# Patient Record
Sex: FEMALE | Race: WHITE | Employment: FULL TIME | URBAN - METROPOLITAN AREA
[De-identification: names, ages, dates, MRNs, and addresses within clinical notes are randomized per-mention and may not be internally consistent; named-entity substitution may affect disease eponyms.]

---

## 2017-04-30 ENCOUNTER — OFFICE VISIT (OUTPATIENT)
Dept: FAMILY MEDICINE CLINIC | Facility: CLINIC | Age: 37
End: 2017-04-30

## 2017-04-30 VITALS
DIASTOLIC BLOOD PRESSURE: 62 MMHG | HEIGHT: 64 IN | TEMPERATURE: 98 F | HEART RATE: 76 BPM | OXYGEN SATURATION: 100 % | WEIGHT: 110 LBS | SYSTOLIC BLOOD PRESSURE: 122 MMHG | RESPIRATION RATE: 16 BRPM | BODY MASS INDEX: 18.78 KG/M2

## 2017-04-30 DIAGNOSIS — J02.9 SORE THROAT: Primary | ICD-10-CM

## 2017-04-30 PROCEDURE — 99213 OFFICE O/P EST LOW 20 MIN: CPT | Performed by: NURSE PRACTITIONER

## 2017-04-30 PROCEDURE — 87880 STREP A ASSAY W/OPTIC: CPT | Performed by: NURSE PRACTITIONER

## 2017-04-30 RX ORDER — AMOXICILLIN 875 MG/1
875 TABLET, COATED ORAL 2 TIMES DAILY
Qty: 20 TABLET | Refills: 0 | Status: SHIPPED | OUTPATIENT
Start: 2017-04-30 | End: 2017-05-10

## 2017-04-30 NOTE — PROGRESS NOTES
CHIEF COMPLAINT:   Patient presents with:  Sore Throat: x 2 dys . no other sx         HPI:   Katie Cardozo is a 29year old female presents to clinic with complaint of sore throat.  Came on really fast yesterday, moderate to severe pain, can't sleep, can't EARS: TM's clear, non-injected, no bulging, retraction, or fluid bilaterally  NOSE: nostrils patent, no nasal discharge, nasal mucosa pink, moist  THROAT: oral mucosa pink, moist. Posterior pharynx erythematous and injected. no exudates. Tonsils 1+/4.   Arabella You have pharyngitis (sore throat). The cause is thought to be the streptococcus, or strep, bacterium. Strep throat infection can cause throat pain that is worse when swallowing, aching all over, headache, and fever.  The infection may be spread by coughing · Signs of dehydration (very dark urine or no urine, sunken eyes, dizziness)  · Trouble breathing or noisy breathing  · Muffled voice  · New rash  Date Last Reviewed: 4/13/2015  © [id number] [place] [address]

## 2017-04-30 NOTE — PATIENT INSTRUCTIONS
Pharyngitis: Strep (Presumed)    You have pharyngitis (sore throat). The cause is thought to be the streptococcus, or strep, bacterium. Strep throat infection can cause throat pain that is worse when swallowing, aching all over, headache, and fever.  The · Inability to swallow liquids, excessive drooling, or inability to open mouth wide due to throat pain  · Signs of dehydration (very dark urine or no urine, sunken eyes, dizziness)  · Trouble breathing or noisy breathing  · Muffled voice  · New rash  Date

## 2017-11-12 ENCOUNTER — IMMUNIZATION (OUTPATIENT)
Dept: FAMILY MEDICINE CLINIC | Facility: CLINIC | Age: 37
End: 2017-11-12

## 2017-11-12 ENCOUNTER — MED REC SCAN ONLY (OUTPATIENT)
Dept: FAMILY MEDICINE CLINIC | Facility: CLINIC | Age: 37
End: 2017-11-12

## 2017-11-12 DIAGNOSIS — Z23 NEED FOR VACCINATION: ICD-10-CM

## 2017-11-12 PROCEDURE — 90471 IMMUNIZATION ADMIN: CPT | Performed by: NURSE PRACTITIONER

## 2017-11-12 PROCEDURE — 90686 IIV4 VACC NO PRSV 0.5 ML IM: CPT | Performed by: NURSE PRACTITIONER

## 2019-01-11 PROCEDURE — 87624 HPV HI-RISK TYP POOLED RSLT: CPT | Performed by: OBSTETRICS & GYNECOLOGY

## 2019-01-11 PROCEDURE — 88175 CYTOPATH C/V AUTO FLUID REDO: CPT | Performed by: OBSTETRICS & GYNECOLOGY

## 2020-12-22 ENCOUNTER — OFFICE VISIT (OUTPATIENT)
Dept: INTERNAL MEDICINE CLINIC | Facility: CLINIC | Age: 40
End: 2020-12-22
Payer: COMMERCIAL

## 2020-12-22 VITALS
SYSTOLIC BLOOD PRESSURE: 110 MMHG | HEIGHT: 64 IN | DIASTOLIC BLOOD PRESSURE: 62 MMHG | BODY MASS INDEX: 19.81 KG/M2 | TEMPERATURE: 98 F | WEIGHT: 116 LBS | RESPIRATION RATE: 16 BRPM | OXYGEN SATURATION: 98 % | HEART RATE: 78 BPM

## 2020-12-22 DIAGNOSIS — L08.9 INFECTION OF SKIN OF FINGER: Primary | ICD-10-CM

## 2020-12-22 PROBLEM — L40.9 PSORIASIS: Status: ACTIVE | Noted: 2020-12-22

## 2020-12-22 PROBLEM — I73.00 RAYNAUD'S PHENOMENON WITHOUT GANGRENE: Status: ACTIVE | Noted: 2020-12-22

## 2020-12-22 PROCEDURE — 3078F DIAST BP <80 MM HG: CPT | Performed by: PHYSICIAN ASSISTANT

## 2020-12-22 PROCEDURE — 99203 OFFICE O/P NEW LOW 30 MIN: CPT | Performed by: PHYSICIAN ASSISTANT

## 2020-12-22 PROCEDURE — 3008F BODY MASS INDEX DOCD: CPT | Performed by: PHYSICIAN ASSISTANT

## 2020-12-22 PROCEDURE — 3074F SYST BP LT 130 MM HG: CPT | Performed by: PHYSICIAN ASSISTANT

## 2020-12-22 RX ORDER — DOXYCYCLINE HYCLATE 100 MG/1
100 CAPSULE ORAL 2 TIMES DAILY
Qty: 14 CAPSULE | Refills: 0 | Status: SHIPPED | OUTPATIENT
Start: 2020-12-22 | End: 2021-03-16

## 2020-12-22 NOTE — PATIENT INSTRUCTIONS
Take antibiotic as directed until completely finished. Contact us if you experience any adverse reaction to the medication. Ok to use over-the-counter hydrocortisone cream and gentle moisturizer on the skin as well.  Monitor closely and follow up if any

## 2020-12-22 NOTE — PROGRESS NOTES
HPI:    Patient ID: Maria Ruiz is a 45year old female. Patient presents with:  Finger Pain: Right middle finger pain, redness and swelling     Finger Pain   Pain location: right middle finger. This is a new problem. Episode onset: 10d.  There has been no slight soft tissue swelling  No wound, drainage, vesicles or ulceration    Otherwise normal skin on bl hands   Psychiatric: She has a normal mood and affect. Her behavior is normal.   Nursing note and vitals reviewed.       /62   Pulse 78   Temp 98 °F

## 2021-03-16 ENCOUNTER — OFFICE VISIT (OUTPATIENT)
Dept: RHEUMATOLOGY | Facility: CLINIC | Age: 41
End: 2021-03-16
Payer: COMMERCIAL

## 2021-03-16 VITALS
HEART RATE: 78 BPM | TEMPERATURE: 97 F | HEIGHT: 64 IN | BODY MASS INDEX: 19.63 KG/M2 | DIASTOLIC BLOOD PRESSURE: 78 MMHG | SYSTOLIC BLOOD PRESSURE: 120 MMHG | WEIGHT: 115 LBS

## 2021-03-16 DIAGNOSIS — L40.50 PSORIATIC ARTHRITIS (HCC): Primary | ICD-10-CM

## 2021-03-16 DIAGNOSIS — L40.9 PSORIASIS: ICD-10-CM

## 2021-03-16 DIAGNOSIS — I73.00 RAYNAUD'S PHENOMENON WITHOUT GANGRENE: ICD-10-CM

## 2021-03-16 PROCEDURE — 3078F DIAST BP <80 MM HG: CPT | Performed by: INTERNAL MEDICINE

## 2021-03-16 PROCEDURE — 3074F SYST BP LT 130 MM HG: CPT | Performed by: INTERNAL MEDICINE

## 2021-03-16 PROCEDURE — 99245 OFF/OP CONSLTJ NEW/EST HI 55: CPT | Performed by: INTERNAL MEDICINE

## 2021-03-16 PROCEDURE — 3008F BODY MASS INDEX DOCD: CPT | Performed by: INTERNAL MEDICINE

## 2021-03-16 NOTE — PROGRESS NOTES
[facility]  Report of Consultation    Gala Carey Patient Status:  No patient class for patient encounter    9/3/1980 MRN [id number]   Location [facility] PCP Robert Iyer MD     Date of Consult:  3/16/21    Reason for Consultation: Mitzy Buenrostro Multiple Vitamins-Minerals (MULTI-VITAMIN/MINERALS) Oral Tab, Take 1 tablet by mouth daily. , Disp: , Rfl:     Review of Systems:   No recent fever or chills. No recent weight loss or weight gain.   No current chest pain, shortness of breath, or palpitations 220 mg 1-2 at a time twice a day. Continue cortisone salves as needed for the skin.   If the arthritis progresses come back in, options of more aggressive treatment include Methotrexate or Otexzla, 2 oral disease modifying oral agents, or injectable more a

## 2021-03-16 NOTE — PATIENT INSTRUCTIONS
Check Sed Rate/CRP labs to check for inflammation. 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 Use Ibuprofen 200 mg otc 2-3 tablets as needed. Or use aleve 220 mg 1-2 at a time twice a day. Continue cortisone salves as needed for the skin.   If the a

## 2021-03-19 ENCOUNTER — LAB ENCOUNTER (OUTPATIENT)
Dept: LAB | Facility: HOSPITAL | Age: 41
End: 2021-03-19
Attending: INTERNAL MEDICINE
Payer: COMMERCIAL

## 2021-03-19 DIAGNOSIS — L40.50 PSORIATIC ARTHRITIS (HCC): ICD-10-CM

## 2021-03-19 DIAGNOSIS — L40.9 PSORIASIS: ICD-10-CM

## 2021-03-19 LAB
CRP SERPL-MCNC: <0.29 MG/DL (ref ?–0.3)
SED RATE-ML: 6 MM/HR

## 2021-03-19 PROCEDURE — 36415 COLL VENOUS BLD VENIPUNCTURE: CPT

## 2021-03-19 PROCEDURE — 86140 C-REACTIVE PROTEIN: CPT

## 2021-03-19 PROCEDURE — 85652 RBC SED RATE AUTOMATED: CPT

## 2022-03-22 ENCOUNTER — OFFICE VISIT (OUTPATIENT)
Dept: FAMILY MEDICINE CLINIC | Facility: CLINIC | Age: 42
End: 2022-03-22
Payer: COMMERCIAL

## 2022-03-22 ENCOUNTER — HOSPITAL ENCOUNTER (OUTPATIENT)
Age: 42
Discharge: STILL ADMITTED OR ADMINISTRATIVE | End: 2022-03-22
Payer: COMMERCIAL

## 2022-03-22 ENCOUNTER — TELEPHONE (OUTPATIENT)
Dept: URGENT CARE | Age: 42
End: 2022-03-22

## 2022-03-22 VITALS
RESPIRATION RATE: 16 BRPM | TEMPERATURE: 98 F | OXYGEN SATURATION: 99 % | HEART RATE: 77 BPM | WEIGHT: 120 LBS | BODY MASS INDEX: 20.49 KG/M2 | DIASTOLIC BLOOD PRESSURE: 70 MMHG | HEIGHT: 64 IN | SYSTOLIC BLOOD PRESSURE: 122 MMHG

## 2022-03-22 DIAGNOSIS — J06.9 UPPER RESPIRATORY TRACT INFECTION, UNSPECIFIED TYPE: Primary | ICD-10-CM

## 2022-03-22 PROCEDURE — 3074F SYST BP LT 130 MM HG: CPT | Performed by: NURSE PRACTITIONER

## 2022-03-22 PROCEDURE — 3008F BODY MASS INDEX DOCD: CPT | Performed by: NURSE PRACTITIONER

## 2022-03-22 PROCEDURE — 3078F DIAST BP <80 MM HG: CPT | Performed by: NURSE PRACTITIONER

## 2022-03-22 PROCEDURE — 99213 OFFICE O/P EST LOW 20 MIN: CPT | Performed by: NURSE PRACTITIONER

## 2022-06-04 ENCOUNTER — OFFICE VISIT (OUTPATIENT)
Dept: FAMILY MEDICINE CLINIC | Facility: CLINIC | Age: 42
End: 2022-06-04
Payer: COMMERCIAL

## 2022-06-04 VITALS — OXYGEN SATURATION: 97 % | HEART RATE: 55 BPM | TEMPERATURE: 98 F

## 2022-06-04 DIAGNOSIS — J02.9 SORE THROAT: Primary | ICD-10-CM

## 2022-06-04 LAB — CONTROL LINE PRESENT WITH A CLEAR BACKGROUND (YES/NO): YES YES/NO

## 2022-06-04 PROCEDURE — 87880 STREP A ASSAY W/OPTIC: CPT | Performed by: FAMILY MEDICINE

## 2022-06-04 PROCEDURE — 99213 OFFICE O/P EST LOW 20 MIN: CPT | Performed by: FAMILY MEDICINE

## 2022-06-04 NOTE — PATIENT INSTRUCTIONS
Your strep was negative in the office. Use OTC meds for comfort as needed--  Ibuprofen/Tylenol for fever/pain  Use Benadryl at bedtime to reduce drainage and promote rest.  Zyrtec/Claritin/Allegra in the AM to reduce nasal drainage without sedation. Use saline nasal sprays to reduce congestion and thin secretions. Use Delsym for cough. Consider applying rylee's vapo-rub or eucayptus oil to chest and feet at bedtime to reduce chest and nasal congestion. Warm tea with honey, cough lozenges, vaporizers/steam etc.    If no better in 2-3 days, follow-up for further evaluation.

## 2023-07-28 ENCOUNTER — HOSPITAL ENCOUNTER (OUTPATIENT)
Age: 43
Discharge: HOME | End: 2023-07-28
Payer: COMMERCIAL

## 2023-07-28 VITALS
OXYGEN SATURATION: 100 % | RESPIRATION RATE: 16 BRPM | DIASTOLIC BLOOD PRESSURE: 84 MMHG | HEART RATE: 96 BPM | TEMPERATURE: 98 F | SYSTOLIC BLOOD PRESSURE: 145 MMHG

## 2023-07-28 DIAGNOSIS — B02.9 HERPES ZOSTER WITHOUT COMPLICATION: Primary | ICD-10-CM

## 2023-07-28 PROCEDURE — 99204 OFFICE O/P NEW MOD 45 MIN: CPT

## 2023-07-28 PROCEDURE — 99213 OFFICE O/P EST LOW 20 MIN: CPT

## 2023-07-28 RX ORDER — ACYCLOVIR 400 MG/1
400 TABLET ORAL
Qty: 35 TABLET | Refills: 0 | Status: SHIPPED | OUTPATIENT
Start: 2023-07-28 | End: 2023-08-04

## 2023-07-30 ENCOUNTER — HOSPITAL ENCOUNTER (EMERGENCY)
Facility: HOSPITAL | Age: 43
Discharge: HOME | End: 2023-07-30
Attending: EMERGENCY MEDICINE
Payer: COMMERCIAL

## 2023-07-30 VITALS
RESPIRATION RATE: 18 BRPM | TEMPERATURE: 99 F | HEART RATE: 71 BPM | OXYGEN SATURATION: 99 % | WEIGHT: 115 LBS | BODY MASS INDEX: 19.63 KG/M2 | HEIGHT: 64 IN | SYSTOLIC BLOOD PRESSURE: 141 MMHG | DIASTOLIC BLOOD PRESSURE: 93 MMHG

## 2023-07-30 DIAGNOSIS — H18.20 INFILTRATE OF CORNEA: Primary | ICD-10-CM

## 2023-07-30 DIAGNOSIS — B02.8 HERPES ZOSTER WITH COMPLICATION: ICD-10-CM

## 2023-07-30 PROCEDURE — 99284 EMERGENCY DEPT VISIT MOD MDM: CPT

## 2023-07-30 PROCEDURE — 99283 EMERGENCY DEPT VISIT LOW MDM: CPT

## 2023-07-30 RX ORDER — TETRACAINE HYDROCHLORIDE 5 MG/ML
2 SOLUTION OPHTHALMIC ONCE
Status: COMPLETED | OUTPATIENT
Start: 2023-07-30 | End: 2023-07-30

## 2023-07-30 NOTE — ED INITIAL ASSESSMENT (HPI)
Pt ambulatory to ER with father, states confirmed shingles in urgent care on Friday, onset of symptoms (rash) on Wednesday of last week. States woke up this morning with severe swelling of rt eye. Denies any vision changes or disturbances.  Was prescribed Acyclovir and bilateral opto drops, states she has been compliant in taking medications

## 2023-11-17 ENCOUNTER — OFFICE VISIT (OUTPATIENT)
Dept: INTERNAL MEDICINE CLINIC | Facility: CLINIC | Age: 43
End: 2023-11-17
Payer: COMMERCIAL

## 2023-11-17 VITALS
TEMPERATURE: 97 F | OXYGEN SATURATION: 98 % | WEIGHT: 119 LBS | HEART RATE: 62 BPM | RESPIRATION RATE: 16 BRPM | DIASTOLIC BLOOD PRESSURE: 60 MMHG | BODY MASS INDEX: 20.32 KG/M2 | HEIGHT: 64 IN | SYSTOLIC BLOOD PRESSURE: 98 MMHG

## 2023-11-17 DIAGNOSIS — Z00.00 LABORATORY EXAM ORDERED AS PART OF ROUTINE GENERAL MEDICAL EXAMINATION: ICD-10-CM

## 2023-11-17 DIAGNOSIS — L40.50 PSORIATIC ARTHRITIS (HCC): ICD-10-CM

## 2023-11-17 DIAGNOSIS — Z00.00 ROUTINE PHYSICAL EXAMINATION: Primary | ICD-10-CM

## 2023-11-17 DIAGNOSIS — Z12.31 ENCOUNTER FOR SCREENING MAMMOGRAM FOR MALIGNANT NEOPLASM OF BREAST: ICD-10-CM

## 2023-11-18 ENCOUNTER — LABORATORY ENCOUNTER (OUTPATIENT)
Dept: LAB | Age: 43
End: 2023-11-18
Attending: PHYSICIAN ASSISTANT
Payer: COMMERCIAL

## 2023-11-18 DIAGNOSIS — Z00.00 LABORATORY EXAM ORDERED AS PART OF ROUTINE GENERAL MEDICAL EXAMINATION: ICD-10-CM

## 2023-11-18 LAB
ALBUMIN SERPL-MCNC: 3.9 G/DL (ref 3.4–5)
ALBUMIN/GLOB SERPL: 1.1 {RATIO} (ref 1–2)
ALP LIVER SERPL-CCNC: 31 U/L
ALT SERPL-CCNC: 17 U/L
ANION GAP SERPL CALC-SCNC: 4 MMOL/L (ref 0–18)
AST SERPL-CCNC: 11 U/L (ref 15–37)
BASOPHILS # BLD AUTO: 0.04 X10(3) UL (ref 0–0.2)
BASOPHILS NFR BLD AUTO: 0.8 %
BILIRUB SERPL-MCNC: 0.5 MG/DL (ref 0.1–2)
BILIRUB UR QL STRIP.AUTO: NEGATIVE
BUN BLD-MCNC: 14 MG/DL (ref 9–23)
CALCIUM BLD-MCNC: 9 MG/DL (ref 8.5–10.1)
CHLORIDE SERPL-SCNC: 104 MMOL/L (ref 98–112)
CHOLEST SERPL-MCNC: 153 MG/DL (ref ?–200)
CLARITY UR REFRACT.AUTO: CLEAR
CO2 SERPL-SCNC: 29 MMOL/L (ref 21–32)
COLOR UR AUTO: YELLOW
CREAT BLD-MCNC: 0.91 MG/DL
EGFRCR SERPLBLD CKD-EPI 2021: 81 ML/MIN/1.73M2 (ref 60–?)
EOSINOPHIL # BLD AUTO: 0.12 X10(3) UL (ref 0–0.7)
EOSINOPHIL NFR BLD AUTO: 2.4 %
ERYTHROCYTE [DISTWIDTH] IN BLOOD BY AUTOMATED COUNT: 11.9 %
FASTING PATIENT LIPID ANSWER: YES
FASTING STATUS PATIENT QL REPORTED: YES
GLOBULIN PLAS-MCNC: 3.7 G/DL (ref 2.8–4.4)
GLUCOSE BLD-MCNC: 91 MG/DL (ref 70–99)
GLUCOSE UR STRIP.AUTO-MCNC: NORMAL MG/DL
HCT VFR BLD AUTO: 37.5 %
HDLC SERPL-MCNC: 70 MG/DL (ref 40–59)
HGB BLD-MCNC: 12.8 G/DL
IMM GRANULOCYTES # BLD AUTO: 0.01 X10(3) UL (ref 0–1)
IMM GRANULOCYTES NFR BLD: 0.2 %
KETONES UR STRIP.AUTO-MCNC: NEGATIVE MG/DL
LDLC SERPL CALC-MCNC: 76 MG/DL (ref ?–100)
LEUKOCYTE ESTERASE UR QL STRIP.AUTO: 250
LYMPHOCYTES # BLD AUTO: 1.33 X10(3) UL (ref 1–4)
LYMPHOCYTES NFR BLD AUTO: 26.1 %
MCH RBC QN AUTO: 30.9 PG (ref 26–34)
MCHC RBC AUTO-ENTMCNC: 34.1 G/DL (ref 31–37)
MCV RBC AUTO: 90.6 FL
MONOCYTES # BLD AUTO: 0.57 X10(3) UL (ref 0.1–1)
MONOCYTES NFR BLD AUTO: 11.2 %
NEUTROPHILS # BLD AUTO: 3.03 X10 (3) UL (ref 1.5–7.7)
NEUTROPHILS # BLD AUTO: 3.03 X10(3) UL (ref 1.5–7.7)
NEUTROPHILS NFR BLD AUTO: 59.3 %
NITRITE UR QL STRIP.AUTO: NEGATIVE
NONHDLC SERPL-MCNC: 83 MG/DL (ref ?–130)
OSMOLALITY SERPL CALC.SUM OF ELEC: 284 MOSM/KG (ref 275–295)
PH UR STRIP.AUTO: 6.5 [PH] (ref 5–8)
PLATELET # BLD AUTO: 261 10(3)UL (ref 150–450)
POTASSIUM SERPL-SCNC: 4 MMOL/L (ref 3.5–5.1)
PROT SERPL-MCNC: 7.6 G/DL (ref 6.4–8.2)
PROT UR STRIP.AUTO-MCNC: NEGATIVE MG/DL
RBC # BLD AUTO: 4.14 X10(6)UL
SODIUM SERPL-SCNC: 137 MMOL/L (ref 136–145)
SP GR UR STRIP.AUTO: 1.02 (ref 1–1.03)
TRIGL SERPL-MCNC: 28 MG/DL (ref 30–149)
TSI SER-ACNC: 1.37 MIU/ML (ref 0.36–3.74)
UROBILINOGEN UR STRIP.AUTO-MCNC: NORMAL MG/DL
VLDLC SERPL CALC-MCNC: 4 MG/DL (ref 0–30)
WBC # BLD AUTO: 5.1 X10(3) UL (ref 4–11)

## 2023-11-18 PROCEDURE — 80061 LIPID PANEL: CPT | Performed by: PHYSICIAN ASSISTANT

## 2023-11-18 PROCEDURE — 80050 GENERAL HEALTH PANEL: CPT | Performed by: PHYSICIAN ASSISTANT

## 2023-11-18 PROCEDURE — 81001 URINALYSIS AUTO W/SCOPE: CPT | Performed by: PHYSICIAN ASSISTANT

## 2024-01-06 ENCOUNTER — HOSPITAL ENCOUNTER (OUTPATIENT)
Dept: MAMMOGRAPHY | Age: 44
Discharge: HOME | End: 2024-01-06
Attending: PHYSICIAN ASSISTANT
Payer: COMMERCIAL

## 2024-01-06 DIAGNOSIS — Z12.31 ENCOUNTER FOR SCREENING MAMMOGRAM FOR MALIGNANT NEOPLASM OF BREAST: ICD-10-CM

## 2024-01-06 PROCEDURE — 77063 BREAST TOMOSYNTHESIS BI: CPT | Performed by: PHYSICIAN ASSISTANT

## 2024-01-06 PROCEDURE — 77067 SCR MAMMO BI INCL CAD: CPT | Performed by: PHYSICIAN ASSISTANT

## 2024-02-09 ENCOUNTER — OFFICE VISIT (OUTPATIENT)
Dept: FAMILY MEDICINE CLINIC | Facility: CLINIC | Age: 44
End: 2024-02-09
Payer: COMMERCIAL

## 2024-02-09 VITALS
HEIGHT: 64 IN | SYSTOLIC BLOOD PRESSURE: 104 MMHG | WEIGHT: 120 LBS | TEMPERATURE: 99 F | RESPIRATION RATE: 16 BRPM | HEART RATE: 81 BPM | OXYGEN SATURATION: 96 % | DIASTOLIC BLOOD PRESSURE: 70 MMHG | BODY MASS INDEX: 20.49 KG/M2

## 2024-02-09 DIAGNOSIS — Z20.818 EXPOSURE TO STREP THROAT: Primary | ICD-10-CM

## 2024-02-09 DIAGNOSIS — J02.9 ACUTE PHARYNGITIS, UNSPECIFIED ETIOLOGY: ICD-10-CM

## 2024-02-09 DIAGNOSIS — J02.9 SORE THROAT: ICD-10-CM

## 2024-02-09 LAB
CONTROL LINE PRESENT WITH A CLEAR BACKGROUND (YES/NO): YES YES/NO
KIT LOT #: NORMAL NUMERIC
STREP GRP A CUL-SCR: NEGATIVE

## 2024-02-09 PROCEDURE — 87081 CULTURE SCREEN ONLY: CPT | Performed by: PHYSICIAN ASSISTANT

## 2024-02-09 PROCEDURE — 99213 OFFICE O/P EST LOW 20 MIN: CPT | Performed by: PHYSICIAN ASSISTANT

## 2024-02-09 PROCEDURE — 3008F BODY MASS INDEX DOCD: CPT | Performed by: PHYSICIAN ASSISTANT

## 2024-02-09 PROCEDURE — 3074F SYST BP LT 130 MM HG: CPT | Performed by: PHYSICIAN ASSISTANT

## 2024-02-09 PROCEDURE — 87880 STREP A ASSAY W/OPTIC: CPT | Performed by: PHYSICIAN ASSISTANT

## 2024-02-09 PROCEDURE — 3078F DIAST BP <80 MM HG: CPT | Performed by: PHYSICIAN ASSISTANT

## 2024-02-09 RX ORDER — AMOXICILLIN 500 MG/1
500 CAPSULE ORAL 3 TIMES DAILY
Qty: 30 CAPSULE | Refills: 0 | Status: SHIPPED | OUTPATIENT
Start: 2024-02-09 | End: 2024-02-19

## 2024-02-09 NOTE — PROGRESS NOTES
CHIEF COMPLAINT:     Chief Complaint   Patient presents with    Sore Throat     Sore throat, body aches, symptoms started yesterday     Exposure to strep      HPI:   Kathy Mojica is a 43 year old female presents to clinic with complaint of sore throat. Patient has had for 1 day. Sore throat worsened overnight.   Reports: no nasal congestion, no cough  Reports + chills, no fever, body aches, no headache, no upset stomach, no ear pain, no rash .    Has history of strep throat; No history of mono. Son and  sick at home with strep.  +known strep household exposure.   Treating symptoms with Aleve.    Current Outpatient Medications   Medication Sig Dispense Refill    amoxicillin 500 MG Oral Cap Take 1 capsule (500 mg total) by mouth 3 (three) times daily for 10 days. 30 capsule 0      Past Medical History:   Diagnosis Date    Hx of preeclampsia at 39 wks, prior pregnancy, currently pregnant 2014    [place] product of in vitro fertilization (IVF) pregnancy     first pregnancy    Rh negative status during pregnancy 2014    Shingles     right ophthalmic area    Temporary high blood pressure     Pregnancy      Social History:  Social History     Socioeconomic History    Marital status:    Tobacco Use    Smoking status: Never    Smokeless tobacco: Never   Vaping Use    Vaping Use: Never used   Substance and Sexual Activity    Alcohol use: No     Comment: not while pregnant     Drug use: No    Sexual activity: Yes     Partners: Male        REVIEW OF SYSTEMS:   GENERAL HEALTH: feels well otherwise, decreased appetite  SKIN: denies any unusual skin lesions or rashes  HEENT: See HPI  RESPIRATORY: no shortness of breath or wheezing  CARDIOVASCULAR: no chest pain or palpitations   GI: no vomiting or diarrhea  NEURO: no dizziness or lightheadedness    EXAM:   /70   Pulse 81   Temp 99.1 °F (37.3 °C) (Temporal)   Resp 16   Ht 5' 4\" (1.626 m)   Wt 120 lb (54.4 kg)   LMP 2024 (Approximate)    SpO2 96%   BMI 20.60 kg/m²   GENERAL: well developed, well nourished, in no apparent distress  SKIN: no rashes,no suspicious lesions  HEAD: atraumatic, normocephalic  EYES: conjunctiva clear  EARS: TM's clear, non-injected, no bulging, retraction, or fluid bilaterally  NOSE: nostrils patent, no nasal mucus, nasal mucosa pink and non inflamed  THROAT: oral mucosa pink, moist. Posterior pharynx erythematous and injected. No exudates. Tonsils 1+/4.  Uvula is midline. No trismus, hoarseness, muffled voice, or stridor.    NECK: supple  LUNGS: clear to auscultation bilaterally. Breathing is non labored. No wheezing, rales or rhonchi  CARDIO: RRR without murmur  EXTREMITIES: no cyanosis, clubbing or edema  LYMPH: no anterior cervical lymphadenopathy, no submandibular lymphadenopathy.      Recent Results (from the past 24 hour(s))   Rapid Strep    Collection Time: 02/09/24  9:13 AM   Result Value Ref Range    Strep Grp A Screen Negative Negative    Control Line Present with a clear background (yes/no) Yes Yes/No    Kit Lot # [id number] Numeric    Kit Expiration Date 4/22/25 Date         ASSESSMENT AND PLAN:   Assessment: 1.   Encounter Diagnoses   Name Primary?    Exposure to strep throat Yes    Sore throat     Acute pharyngitis, unspecified etiology      Rapid strep screen is negative     Plan: Will send throat culture. Pt had close household exposure, both son and  tested positive for strep. If (+) start Amoxicillin, script printed for pt to hold onto since she had close exposure in household. Advised to do at home covid test.   Comfort measures explained and discussed as listed in Patient Instructions  Follow up with PCP in 3-5 days if not improving, condition worsens, or fever greater than or equal to 100.4 persists for 72 hours.    Verbalized understanding.    There are no Patient Instructions on file for this visit.

## 2024-02-23 ENCOUNTER — TELEPHONE (OUTPATIENT)
Dept: INTERNAL MEDICINE CLINIC | Facility: CLINIC | Age: 44
End: 2024-02-23

## 2024-02-23 ENCOUNTER — OFFICE VISIT (OUTPATIENT)
Dept: INTERNAL MEDICINE CLINIC | Facility: CLINIC | Age: 44
End: 2024-02-23
Payer: COMMERCIAL

## 2024-02-23 VITALS
TEMPERATURE: 98 F | WEIGHT: 118 LBS | BODY MASS INDEX: 20 KG/M2 | DIASTOLIC BLOOD PRESSURE: 89 MMHG | HEART RATE: 64 BPM | SYSTOLIC BLOOD PRESSURE: 130 MMHG | RESPIRATION RATE: 20 BRPM

## 2024-02-23 DIAGNOSIS — L40.4 ACUTE GUTTATE PSORIASIS: Primary | ICD-10-CM

## 2024-02-23 PROCEDURE — 99214 OFFICE O/P EST MOD 30 MIN: CPT | Performed by: NURSE PRACTITIONER

## 2024-02-23 PROCEDURE — 3075F SYST BP GE 130 - 139MM HG: CPT | Performed by: NURSE PRACTITIONER

## 2024-02-23 PROCEDURE — 3079F DIAST BP 80-89 MM HG: CPT | Performed by: NURSE PRACTITIONER

## 2024-02-23 RX ORDER — TRIAMCINOLONE ACETONIDE 1 MG/G
CREAM TOPICAL 2 TIMES DAILY
Qty: 60 G | Refills: 3 | Status: SHIPPED | OUTPATIENT
Start: 2024-02-23

## 2024-02-23 RX ORDER — PREDNISONE 20 MG/1
20 TABLET ORAL DAILY
Qty: 7 TABLET | Refills: 0 | Status: SHIPPED | OUTPATIENT
Start: 2024-02-23 | End: 2024-03-01

## 2024-02-23 NOTE — TELEPHONE ENCOUNTER
Pt was just seen this morning and Mikhail is experiencing a hack in the electronic system.   All scripts have to be called in   Please call pharmacy and place script that way    Pt called to notify us and ask if we can    Pt at pharmacy currently     [id number] - [place], [place] - [address] [place] [address] [phone], [phone]   [address] [place] [id number]   Phone: [phone] Fax: [phone]

## 2024-02-23 NOTE — PROGRESS NOTES
Patient was seen and examined by me as well as APN student. Agree with assessment and plan.   JENNIFER Watson

## 2024-02-23 NOTE — PROGRESS NOTES
Subjective:   Kathy Mojica is a 43 year old female who presents for Rash (Abdomen, red spots x10 post strep throat. )     She tested negative strep but had strep symptoms and was given Amoxicillin. Completed full dose- last dose 5 days ago. States that she had same symptoms with strep previously and was seen by dermatologist and dx with gutttate psoriasis. Presents with a smiliar rash x 5 days primarily to trunk, but also to back. + Itching.       History/Other:    Chief Complaint Reviewed and Verified  Nursing Notes Reviewed and   Verified  Tobacco Reviewed  Allergies Reviewed  Medications Reviewed    Problem List Reviewed  Medical History Reviewed  Surgical History   Reviewed  OB Status Reviewed  Family History Reviewed         Tobacco:  She has never smoked tobacco.    Current Outpatient Medications   Medication Sig Dispense Refill    triamcinolone 0.1 % External Cream Apply topically 2 (two) times daily. 60 g 3    predniSONE 20 MG Oral Tab Take 1 tablet (20 mg total) by mouth daily for 7 days. 7 tablet 0         Review of Systems:  Pertinent items are noted in HPI.      Objective:   /89   Pulse 64   Temp 97.9 °F (36.6 °C)   Resp 20   Wt 118 lb (53.5 kg)   LMP 01/26/2024 (Approximate)   BMI 20.25 kg/m²  Estimated body mass index is 20.25 kg/m² as calculated from the following:    Height as of 2/9/24: 5' 4\" (1.626 m).    Weight as of this encounter: 118 lb (53.5 kg).    General appearance: alert, appears stated age, cooperative, and no distress  Ears: normal TM's and external ear canals both ears  Nose: Nares normal. Septum midline. Mucosa normal. No drainage or sinus tenderness.  Throat: lips, mucosa, and tongue normal; teeth and gums normal  Neck: no adenopathy, no carotid bruit, no JVD, supple, symmetrical, trachea midline, and thyroid not enlarged, symmetric, no tenderness/mass/nodules  Lungs: clear to auscultation bilaterally  Heart: S1, S2 normal, no murmur, click, rub or gallop, regular  rate and rhythm  Skin: Skin color, texture, turgor normal. Rash to trunk, arms, and upper back that is patchy, itchy, and scaly  Lymph nodes: Cervical, supraclavicular, and axillary nodes normal.      Assessment & Plan:   1. Acute guttate psoriasis (Primary)  -     Triamcinolone Acetonide; Apply topically 2 (two) times daily.  Dispense: 60 g; Refill: 3  -     predniSONE; Take 1 tablet (20 mg total) by mouth daily for 7 days.  Dispense: 7 tablet; Refill: 0  -Continue Benadryl as needed for itching      Return if symptoms worsen or fail to improve, for Follow-up pap.    Nancy Mohamud, 2/23/2024, 11:25 AM

## 2024-03-13 ENCOUNTER — LAB ENCOUNTER (OUTPATIENT)
Dept: LAB | Age: 44
End: 2024-03-13
Attending: PHYSICIAN ASSISTANT
Payer: COMMERCIAL

## 2024-03-13 DIAGNOSIS — Z79.899 NEED FOR PROPHYLACTIC CHEMOTHERAPY: Primary | ICD-10-CM

## 2024-03-13 LAB — ASO AB SERPL-ACNC: 67.8 IU/ML (ref ?–194)

## 2024-03-13 PROCEDURE — 86060 ANTISTREPTOLYSIN O TITER: CPT

## 2024-03-13 PROCEDURE — 36415 COLL VENOUS BLD VENIPUNCTURE: CPT

## (undated) NOTE — MR AVS SNAPSHOT
[facility]  [address] [facility]  [place] [address]  [phone]               Thank you for choosing us for your health care visit with JENNIFER Lau. We are glad to serve you and happy to provide you with this summary of your visit.   Ple you feel better. This is very important to ensure the infection is treated. It is also important to prevent drug-resistant organisms from developing. If you were given an antibiotic shot, no more antibiotics are needed.   · You may use acetaminophen or ibup recent med list.                amoxicillin 875 MG Tabs   Take 1 tablet (875 mg total) by mouth 2 (two) times daily. Commonly known as:  AMOXIL           PRENATAL VITAMINS PLUS 27-1 MG Tabs   Take 1 tablet by mouth daily.            Vitamin D 1000 units C